# Patient Record
Sex: MALE
[De-identification: names, ages, dates, MRNs, and addresses within clinical notes are randomized per-mention and may not be internally consistent; named-entity substitution may affect disease eponyms.]

---

## 2024-07-22 ENCOUNTER — APPOINTMENT (OUTPATIENT)
Dept: ORTHOPEDIC SURGERY | Facility: CLINIC | Age: 64
End: 2024-07-22

## 2024-07-22 DIAGNOSIS — E78.00 PURE HYPERCHOLESTEROLEMIA, UNSPECIFIED: ICD-10-CM

## 2024-07-22 DIAGNOSIS — Z86.59 PERSONAL HISTORY OF OTHER MENTAL AND BEHAVIORAL DISORDERS: ICD-10-CM

## 2024-07-22 DIAGNOSIS — F17.200 NICOTINE DEPENDENCE, UNSPECIFIED, UNCOMPLICATED: ICD-10-CM

## 2024-07-22 DIAGNOSIS — M16.0 BILATERAL PRIMARY OSTEOARTHRITIS OF HIP: ICD-10-CM

## 2024-07-22 PROBLEM — Z00.00 ENCOUNTER FOR PREVENTIVE HEALTH EXAMINATION: Status: ACTIVE | Noted: 2024-07-22

## 2024-07-22 PROCEDURE — 20611 DRAIN/INJ JOINT/BURSA W/US: CPT | Mod: 50

## 2024-07-22 PROCEDURE — 99203 OFFICE O/P NEW LOW 30 MIN: CPT | Mod: 25

## 2024-07-22 RX ORDER — CLOPIDOGREL 75 MG/1
75 TABLET, FILM COATED ORAL
Refills: 0 | Status: ACTIVE | COMMUNITY

## 2024-07-22 RX ORDER — TRAMADOL HYDROCHLORIDE 50 MG/1
50 TABLET, COATED ORAL
Qty: 28 | Refills: 0 | Status: ACTIVE | COMMUNITY
Start: 2024-07-22 | End: 1900-01-01

## 2024-07-22 RX ORDER — TADALAFIL 5 MG/1
5 TABLET, FILM COATED ORAL
Refills: 0 | Status: ACTIVE | COMMUNITY

## 2024-07-22 RX ORDER — CARVEDILOL 3.12 MG/1
3.12 TABLET, FILM COATED ORAL
Refills: 0 | Status: ACTIVE | COMMUNITY

## 2024-07-22 RX ORDER — ATORVASTATIN CALCIUM 80 MG/1
80 TABLET, FILM COATED ORAL
Refills: 0 | Status: ACTIVE | COMMUNITY

## 2024-07-30 NOTE — HISTORY OF PRESENT ILLNESS
[de-identified] : The patient is a 64 year old [right] hand dominant M who presents today complaining of solomon hips.  Date of Injury/Onset: Left ~4 year, Right ~ 1 month Pain:    At Rest: 9/10 With Activity:  9/10 Mechanism of injury: NKI, Pain is gradually getting worse  This is [not] a Work Related Injury being treated under Worker's Compensation. This is [not] an athletic injury occurring associated with an interscholastic or organized sports team. Quality of symptoms: aching and sharp occasionally  Improves with: liquid prednisone worked for 4 hours  Worse with: constant, sleeping and laying down Prior treatment/ Imaging: X-ray @ [facility] MD Out of work/sport: 3 days School/Sport/Position/Occupation: full-time, RN Additional Information: [None]

## 2024-07-30 NOTE — PROCEDURE
[FreeTextEntry3] : Patient Identification  Name/: Verbal with patient and/or family    Procedure Verification:  Procedure confirmed with patient or family/designee  Consent for procedure: Verbal Consent Given  Relevant documentation completed, reviewed, and signed  Clinical indications for procedure confirmed    Time-out with all members of procedure team immediately prior to procedure:  Correct patient identified. Agreement on procedure. Correct side and site.    ULTRASOUND GUIDED GREATER TROCHANTERIC BURSAL INJECTION - LEFT  After verbal consent and identification of the correct patient and correct site, the anterolateral left hip over the greater trochanter was prepped using alcohol swabs and betadine. This was allowed time to air dry. After ethyl choride spray for skin anesthesia, a mixture of 1cc Celestone 6mg/ml, 2cc Lidocaine 1%, and 2cc Bupivacaine 0.5% was injected under ultrasound guidance into the greater trochanteric bursal space using a sterile 22G spinal needle. Visualization of the needle and placement of the injection was performed without any complications. Ultrasound was used for visualization, precise injection in area of bursitis, and / or prior failure or difficult injection. The patient tolerated the procedure well. After-care instructions were provided and included instructions to ice the area and to call if redness, pain, or fever develop.  Patient Identification  Name/: Verbal with patient and/or family    Procedure Verification:  Procedure confirmed with patient or family/designee  Consent for procedure: Verbal Consent Given  Relevant documentation completed, reviewed, and signed  Clinical indications for procedure confirmed    Time-out with all members of procedure team immediately prior to procedure:  Correct patient identified. Agreement on procedure. Correct side and site.    ULTRASOUND GUIDED GREATER TROCHANTERIC BURSAL INJECTION - RIGHT  After verbal consent and identification of the correct patient and correct site, the anterolateral right hip over the greater trochanter was prepped using alcohol swabs and betadine. This was allowed time to air dry. After ethyl choride spray for skin anesthesia, a mixture of 1cc Celestone 6mg/ml, 2cc Lidocaine 1%, and 2cc Bupivacaine 0.5% was injected under ultrasound guidance into the greater trochanteric bursal space using a sterile 22G spinal needle. Visualization of the needle and placement of the injection was performed without any complications. Ultrasound was used for visualization, precise injection in area of bursitis, and / or prior failure or difficult injection. The patient tolerated the procedure well. After-care instructions were provided and included instructions to ice the area and to call if redness, pain, or fever develop. [Large Joint Injection] : Large joint injection [Bilateral] : bilaterally of the [Hip] : hip [Pain] : pain [Inflammation] : inflammation [X-ray evidence of Osteoarthritis on this or prior visit] : x-ray evidence of Osteoarthritis on this or prior visit [Alcohol] : alcohol [Betadine] : betadine [Ethyl Chloride sprayed topically] : ethyl chloride sprayed topically [Sterile technique used] : sterile technique used [___ cc    6mg] :  Betamethasone (Celestone) ~Vcc of 6mg [___ cc    1%] : Lidocaine ~Vcc of 1%  [___ cc    0.25%] : Bupivacaine (Marcaine) ~Vcc of 0.25%  [] : Patient tolerated procedure well [Call if redness, pain or fever occur] : call if redness, pain or fever occur [Apply ice for 15min out of every hour for the next 12-24 hours as tolerated] : apply ice for 15 minutes out of every hour for the next 12-24 hours as tolerated [Previous OTC use and PT nontherapeutic] : patient has tried OTC's including aspirin, Ibuprofen, Aleve, etc or prescription NSAIDS, and/or exercises at home and/or physical therapy without satisfactory response [Patient had decreased mobility in the joint] : patient had decreased mobility in the joint [Risks, benefits, alternatives discussed / Verbal consent obtained] : the risks benefits, and alternatives have been discussed, and verbal consent was obtained [Precise injection in area of tear] : precise injection in area of tear [Prior failure or difficult injection] : prior failure or difficult injection [All ultrasound images have been permanently captured and stored accordingly in our picture archiving and communication system] : All ultrasound images have been permanently captured and stored accordingly in our picture archiving and communication system [Visualization of the needle and placement of injection was performed without complication] : visualization of the needle and placement of injection was performed without complication

## 2024-07-30 NOTE — IMAGING
[Bilateral] : hip with pelvis bilaterally [There are no fractures, subluxations or dislocations. No significant abnormalities are seen] : There are no fractures, subluxations or dislocations. No significant abnormalities are seen [Mild arthritis (Tonnis Grade 1)] : Mild arthritis (Tonnis Grade 1) [Femoral CAM lesion with Alpha angle greater than 50] : Femoral CAM lesion with Alpha angle greater than 50 [de-identified] : The patient is a well appearing 64 year man .   Patient ambulates with a antalgic gait.   Pelvis:   Symphysis pubis: Stable, no tenderness to palpation   Palpable Hernias/Masses: None  Resisted Sit Up: Negative     Right Hip/Groin/Thigh:    ROM:      Flexion: 0-100 degrees      Extension: 0-10 degrees      ABduction: 0-15 degrees      Adduction: 0-15 degrees      External Rotation: 0-15 degrees      Internal Rotation: 0 degrees   Pain with range of motion in all planes  PROVOCATIVE TESTING:       JORDYN TST: Negative       BILL'S TST: Positive       PIRIFORMIS TST: Negative       Straight Leg Raise:  Negative       Seated Straight Leg Raise: Negative       IMPINGEMENT TST: Positive       Resisted ADduction : Negative  PALPATION:          ASIS: Nontender          AIIS: Nontender          Greater Trochanter/IT-Band: TTP          Illiac Crest: Nontender          Ischial Tuberosity: Nontender          Hip Flexor: Nontender          Quadriceps: Nontender          Proximal Hamstring Origin: Nontender          Proximal Hamstring Muscle-Tendon Junction: Nontender          Hamstring Muscle Belly: Nontender          ADductor :  Nontender           Lower Rectus Abdominis:  Nontender  INSPECTION:          Deformity: No           Erythema: No          Ecchymosis: No          Abrasions: No          Effusion: No          Groin mass/bulge: No          Palpable Hernia: No  NEUROLOGIC EXAM:          Sensation L2-S1: Grossly Intact  MOTOR EXAM:          Quadriceps: 5 out of 5          Hamstrings: 5 out of 5          ABduction: 5 out of 5          ADduction: 5 out of 5          Hip Flexion: 5 out of 5          TA: 5 out of 5          GS: 5 out of 5  Circulatory/Pulses:          Dorsalis Pedis:      2+          Posterior Tibialis: 2+    Left Hip/Groin/Thigh:    ROM:      Flexion: 0-100 degrees      Extension: 0-10 degrees      ABduction: 0-15 degrees      Adduction: 0-15 degrees      External Rotation: 0-15 degrees      Internal Rotation: 0 degrees   Pain with range of motion in all planes  PROVOCATIVE TESTING:       JORDYN TST: Negative       BILL'S TST: Positive       PIRIFORMIS TST: Negative       Straight Leg Raise:  Negative       Seated Straight Leg Raise: Negative       IMPINGEMENT TST: Positive       Resisted ADduction : Negative  PALPATION:          ASIS: Nontender          AIIS: Nontender          Greater Trochanter/IT-Band: TTP          Illiac Crest: Nontender          Ischial Tuberosity: Nontender          Hip Flexor: Nontender          Quadriceps: Nontender          Proximal Hamstring Origin: Nontender          Proximal Hamstring Muscle-Tendon Junction: Nontender          Hamstring Muscle Belly: Nontender          ADductor :  Nontender           Lower Rectus Abdominis:  Nontender  INSPECTION:          Deformity: No           Erythema: No          Ecchymosis: No          Abrasions: No          Effusion: No          Groin mass/bulge: No          Palpable Hernia: No  NEUROLOGIC EXAM:          Sensation L2-S1: Grossly Intact  MOTOR EXAM:          Quadriceps: 5 out of 5          Hamstrings: 5 out of 5          ABduction: 5 out of 5          ADduction: 5 out of 5          Hip Flexion: 5 out of 5          TA: 5 out of 5          GS: 5 out of 5  Circulatory/Pulses:          Dorsalis Pedis:      2+          Posterior Tibialis: 2+

## 2024-07-30 NOTE — DISCUSSION/SUMMARY
[de-identified] : Assessment:   The patient presents with history, examination and imaging that are most consistent with a diagnosis of:  BL hips primary osteoarthritis    ***The patient would like to pursue conservative measures at this time. After consideration of various non-operative treatment modalities, the patient would like to proceed with the modalities listed below.   During this appointment the patient was examined, diagnoses were discussed and explained in a face to face manner. In addition extensive time was spent reviewing aforementioned diagnostic studies. Counseling including abnormal image results, differential diagnoses, treatment options, risk and benefits, lifestyle changes, current condition, and current medications was performed. Patient's comments, questions, and concerns were address and patient verbalized understanding.  The natural progression of osteoarthritis was explained to the patient.  We discussed the possible treatment options from conservative to operative.  These included NSAIDS, Glucosamine and Chondrotin sulfate, and Physical Therapy as well different types of injections.  We also discussed that at some point they may progress to needed a LESLIE.  Information and pamphlets were given.  We discussed their diagnosis and treatment options at length including surgical and non-surgical options. We will first attempt conservative treatment with activity modification, PT, icing, weight loss, and anti-inflammatory medications. We discussed the possible of injections (steroid and viscosupplementation) in the future. The patient was provided with a PT prescription to work on ROM, hip ER/abductors strengthening, quad/hamstring stretches and strengthening, and other exercises on the Hip Arthritis Protocol.  Dx / Natural History: The patient was advised of the diagnosis.  The natural history of the pathology was explained in full to the patient in layman's terms.  Several different treatment options were discussed and explained in full to the patient including the risks and benefits of both surgical and non-surgical treatments.  All questions and concerns were answered.   Pain Guide Activities: The patient was advised to let pain guide the gradual advancement of activities.  Physical Therapy: The patient was provided with a prescription for Physical Therapy.  Icing: The patient was advised to apply ice (wrapped in a towel or protective covering) to the area daily (20 minutes at a time, 2-4X/day).   ---------------------------     Plan: - CSI BL hips given today - tolerated well - ice  - physical therapy script provided     Follow-up:  3 months

## 2024-07-30 NOTE — HISTORY OF PRESENT ILLNESS
[de-identified] : The patient is a 64 year old [right] hand dominant M who presents today complaining of solomon hips.  Date of Injury/Onset: Left ~4 year, Right ~ 1 month Pain:    At Rest: 9/10 With Activity:  9/10 Mechanism of injury: NKI, Pain is gradually getting worse  This is [not] a Work Related Injury being treated under Worker's Compensation. This is [not] an athletic injury occurring associated with an interscholastic or organized sports team. Quality of symptoms: aching and sharp occasionally  Improves with: liquid prednisone worked for 4 hours  Worse with: constant, sleeping and laying down Prior treatment/ Imaging: X-ray @ [facility] MD Out of work/sport: 3 days School/Sport/Position/Occupation: full-time, RN Additional Information: [None]

## 2024-07-30 NOTE — IMAGING
[Bilateral] : hip with pelvis bilaterally [There are no fractures, subluxations or dislocations. No significant abnormalities are seen] : There are no fractures, subluxations or dislocations. No significant abnormalities are seen [Mild arthritis (Tonnis Grade 1)] : Mild arthritis (Tonnis Grade 1) [Femoral CAM lesion with Alpha angle greater than 50] : Femoral CAM lesion with Alpha angle greater than 50 [de-identified] : The patient is a well appearing 64 year man .   Patient ambulates with a antalgic gait.   Pelvis:   Symphysis pubis: Stable, no tenderness to palpation   Palpable Hernias/Masses: None  Resisted Sit Up: Negative     Right Hip/Groin/Thigh:    ROM:      Flexion: 0-100 degrees      Extension: 0-10 degrees      ABduction: 0-15 degrees      Adduction: 0-15 degrees      External Rotation: 0-15 degrees      Internal Rotation: 0 degrees   Pain with range of motion in all planes  PROVOCATIVE TESTING:       JORDYN TST: Negative       BILL'S TST: Positive       PIRIFORMIS TST: Negative       Straight Leg Raise:  Negative       Seated Straight Leg Raise: Negative       IMPINGEMENT TST: Positive       Resisted ADduction : Negative  PALPATION:          ASIS: Nontender          AIIS: Nontender          Greater Trochanter/IT-Band: TTP          Illiac Crest: Nontender          Ischial Tuberosity: Nontender          Hip Flexor: Nontender          Quadriceps: Nontender          Proximal Hamstring Origin: Nontender          Proximal Hamstring Muscle-Tendon Junction: Nontender          Hamstring Muscle Belly: Nontender          ADductor :  Nontender           Lower Rectus Abdominis:  Nontender  INSPECTION:          Deformity: No           Erythema: No          Ecchymosis: No          Abrasions: No          Effusion: No          Groin mass/bulge: No          Palpable Hernia: No  NEUROLOGIC EXAM:          Sensation L2-S1: Grossly Intact  MOTOR EXAM:          Quadriceps: 5 out of 5          Hamstrings: 5 out of 5          ABduction: 5 out of 5          ADduction: 5 out of 5          Hip Flexion: 5 out of 5          TA: 5 out of 5          GS: 5 out of 5  Circulatory/Pulses:          Dorsalis Pedis:      2+          Posterior Tibialis: 2+    Left Hip/Groin/Thigh:    ROM:      Flexion: 0-100 degrees      Extension: 0-10 degrees      ABduction: 0-15 degrees      Adduction: 0-15 degrees      External Rotation: 0-15 degrees      Internal Rotation: 0 degrees   Pain with range of motion in all planes  PROVOCATIVE TESTING:       JORDYN TST: Negative       BILL'S TST: Positive       PIRIFORMIS TST: Negative       Straight Leg Raise:  Negative       Seated Straight Leg Raise: Negative       IMPINGEMENT TST: Positive       Resisted ADduction : Negative  PALPATION:          ASIS: Nontender          AIIS: Nontender          Greater Trochanter/IT-Band: TTP          Illiac Crest: Nontender          Ischial Tuberosity: Nontender          Hip Flexor: Nontender          Quadriceps: Nontender          Proximal Hamstring Origin: Nontender          Proximal Hamstring Muscle-Tendon Junction: Nontender          Hamstring Muscle Belly: Nontender          ADductor :  Nontender           Lower Rectus Abdominis:  Nontender  INSPECTION:          Deformity: No           Erythema: No          Ecchymosis: No          Abrasions: No          Effusion: No          Groin mass/bulge: No          Palpable Hernia: No  NEUROLOGIC EXAM:          Sensation L2-S1: Grossly Intact  MOTOR EXAM:          Quadriceps: 5 out of 5          Hamstrings: 5 out of 5          ABduction: 5 out of 5          ADduction: 5 out of 5          Hip Flexion: 5 out of 5          TA: 5 out of 5          GS: 5 out of 5  Circulatory/Pulses:          Dorsalis Pedis:      2+          Posterior Tibialis: 2+

## 2024-07-30 NOTE — DISCUSSION/SUMMARY
[de-identified] : Assessment:   The patient presents with history, examination and imaging that are most consistent with a diagnosis of:  BL hips primary osteoarthritis    ***The patient would like to pursue conservative measures at this time. After consideration of various non-operative treatment modalities, the patient would like to proceed with the modalities listed below.   During this appointment the patient was examined, diagnoses were discussed and explained in a face to face manner. In addition extensive time was spent reviewing aforementioned diagnostic studies. Counseling including abnormal image results, differential diagnoses, treatment options, risk and benefits, lifestyle changes, current condition, and current medications was performed. Patient's comments, questions, and concerns were address and patient verbalized understanding.  The natural progression of osteoarthritis was explained to the patient.  We discussed the possible treatment options from conservative to operative.  These included NSAIDS, Glucosamine and Chondrotin sulfate, and Physical Therapy as well different types of injections.  We also discussed that at some point they may progress to needed a LESLIE.  Information and pamphlets were given.  We discussed their diagnosis and treatment options at length including surgical and non-surgical options. We will first attempt conservative treatment with activity modification, PT, icing, weight loss, and anti-inflammatory medications. We discussed the possible of injections (steroid and viscosupplementation) in the future. The patient was provided with a PT prescription to work on ROM, hip ER/abductors strengthening, quad/hamstring stretches and strengthening, and other exercises on the Hip Arthritis Protocol.  Dx / Natural History: The patient was advised of the diagnosis.  The natural history of the pathology was explained in full to the patient in layman's terms.  Several different treatment options were discussed and explained in full to the patient including the risks and benefits of both surgical and non-surgical treatments.  All questions and concerns were answered.   Pain Guide Activities: The patient was advised to let pain guide the gradual advancement of activities.  Physical Therapy: The patient was provided with a prescription for Physical Therapy.  Icing: The patient was advised to apply ice (wrapped in a towel or protective covering) to the area daily (20 minutes at a time, 2-4X/day).   ---------------------------     Plan: - CSI BL hips given today - tolerated well - ice  - physical therapy script provided     Follow-up:  3 months

## 2025-03-28 ENCOUNTER — TRANSCRIPTION ENCOUNTER (OUTPATIENT)
Age: 65
End: 2025-03-28

## 2025-03-28 ENCOUNTER — RESULT REVIEW (OUTPATIENT)
Age: 65
End: 2025-03-28